# Patient Record
Sex: MALE | Race: WHITE
[De-identification: names, ages, dates, MRNs, and addresses within clinical notes are randomized per-mention and may not be internally consistent; named-entity substitution may affect disease eponyms.]

---

## 2020-01-19 ENCOUNTER — HOSPITAL ENCOUNTER (EMERGENCY)
Dept: HOSPITAL 56 - MW.ED | Age: 30
Discharge: HOME | End: 2020-01-19
Payer: COMMERCIAL

## 2020-01-19 DIAGNOSIS — K08.89: Primary | ICD-10-CM

## 2020-01-19 DIAGNOSIS — Z88.0: ICD-10-CM

## 2020-01-19 DIAGNOSIS — K02.9: Primary | ICD-10-CM

## 2020-01-19 PROCEDURE — 64400 NJX AA&/STRD TRIGEMINAL NRV: CPT

## 2020-01-19 PROCEDURE — 70486 CT MAXILLOFACIAL W/O DYE: CPT

## 2020-01-19 PROCEDURE — 99283 EMERGENCY DEPT VISIT LOW MDM: CPT

## 2020-01-19 PROCEDURE — 99282 EMERGENCY DEPT VISIT SF MDM: CPT

## 2020-01-19 PROCEDURE — 96372 THER/PROPH/DIAG INJ SC/IM: CPT

## 2020-01-19 NOTE — EDM.PDOC
ED HPI GENERAL MEDICAL PROBLEM





- General


Chief Complaint: General


Stated Complaint: SEVERE PAIN IN HEAD


Time Seen by Provider: 01/19/20 17:17





- History of Present Illness


INITIAL COMMENTS - FREE TEXT/NARRATIVE: 


Patient is a 29-year-old male with no significant past medical history 

presenting with chief complaint of dental pain.  Patient reports dental pain x1 

month from a chipped tooth.  Patient states that pain is becoming more severe 

causing him to have a headache.  Patient denies any fevers, chills, nausea, 

vomiting.  Patient denies any swelling of the area that is painful.  Patient 

states that he has missed 3 dentist appointments to follow-up for this dental 

pain.  Patient was here several hours ago but was unable to  any of his 

medications from the pharmacy due to issues with his Medicaid card.





Comprehensive review of systems was performed otherwise negative except as per 

HPI.





I have reviewed the triage vital signs


Const: Well nourished, well developed, appears stated age.  Nontoxic


Eyes: PERRL, no conjunctival injection


HENT: Fractured tooth #27.  No swelling of the submandibular area.  No trismus.

  No elevation of the tongue.  No drooling.  NCAT, Neck supple without 

meningismus 


MSK: No gross deformities appreciated


Skin: Warm, dry. No rashes


Neuro: Alert, CNs II-XII grossly intact. Sensation and motor function of 

extremities grossly intact.


Psych: Appropriate mood and affect





Assessment and plan:


Patient is a 29-year-old male presenting with chief complaint of dental pain.  

Patient has no major abscess noted on physical exam.  Patient underwent dental 

block, submandibular as well as inferior alveolar block for pain control.  

Patient tolerated procedure well with no immediate complications.  Patient 

instructed that he would not be given any more narcotic medications and he 

needs to schedule a dental appointment soon as possible to alleviate pain.  

Patient given strict return precautions to the emergency room.  All questions 

addressed and answered.  Patient agrees with plan





  ** dental


Pain Score (Numeric/FACES): 10





- Related Data


 Allergies











Allergy/AdvReac Type Severity Reaction Status Date / Time


 


Penicillins Allergy  Other Verified 01/19/20 16:08











Home Meds: 


 Home Meds





. [No Known Home Meds]  01/19/20 [History]











Past Medical History


HEENT History: Reports: None


Cardiovascular History: Reports: None


Respiratory History: Reports: None


Gastrointestinal History: Reports: None


Genitourinary History: Reports: None


Musculoskeletal History: Reports: None


Neurological History: Reports: None


Psychiatric History: Reports: None


Endocrine/Metabolic History: Reports: None


Hematologic History: Reports: None


Immunologic History: Reports: None


Oncologic (Cancer) History: Reports: None


Dermatologic History: Reports: None





- Past Surgical History


Head Surgeries/Procedures: Reports: None


HEENT Surgical History: Reports: Tonsillectomy


Cardiovascular Surgical History: Reports: None


Respiratory Surgical History: Reports: None


GI Surgical History: Reports: None


Male  Surgical History: Reports: None


Endocrine Surgical History: Reports: None


Neurological Surgical History: Reports: None


Musculoskeletal Surgical History: Reports: Other (See Below)


Other Musculoskeletal Surgeries/Procedures:: right hand surgery, scope to left 

knee


Oncologic Surgical History: Reports: None


Dermatological Surgical History: Reports: None





Social & Family History





- Family History


Family Medical History: Noncontributory





- Tobacco Use


Smoking Status *Q: Never Smoker


Second Hand Smoke Exposure: No





- Caffeine Use


Caffeine Use: Reports: None





- Recreational Drug Use


Recreational Drug Use: No





ED ROS GENERAL





- Review of Systems


Review Of Systems: See Below





ED EXAM, GENERAL





- Physical Exam


Exam: See Below





Course





- Vital Signs


Last Recorded V/S: 


 Last Vital Signs











Temp  36.3 C   01/19/20 16:06


 


Pulse  73   01/19/20 16:06


 


Resp  18   01/19/20 16:06


 


BP  145/97 H  01/19/20 16:06


 


Pulse Ox  97   01/19/20 16:06














- Orders/Labs/Meds


Meds: 


Medications














Discontinued Medications














Generic Name Dose Route Start Last Admin





  Trade Name Bobbyq  PRN Reason Stop Dose Admin


 


Bupivacaine HCl  10 ml  01/19/20 16:39  





  Sensorcaine-Mpf 0.5%  INJECT  01/19/20 16:40  





  ONETIME ONE   





     





     





     





     


 


Clindamycin HCl  150 mg  01/19/20 17:26  





  Cleocin  PO  01/19/20 17:27  





  ONETIME ONE   





     





     





     





     














Departure





- Departure


Time of Disposition: 17:30


Disposition: Home, Self-Care 01


Clinical Impression: 


 Pain, dental








- Discharge Information


Referrals: 


PCP,None [Primary Care Provider] - 


Forms:  ED Department Discharge


Additional Instructions: 


The following information is given to patients seen in the emergency department 

who are being discharged to home. This information is to outline your options 

for follow-up care. We provide all patients seen in our emergency department 

with a follow-up referral.





The need for follow-up, as well as the timing and circumstances, are variable 

depending upon the specifics of your emergency department visit.





If you don't have a primary care physician on staff, we will provide you with a 

referral. We always advise you to contact your personal physician following an 

emergency department visit to inform them of the circumstance of the visit and 

for follow-up with them and/or the need for any referrals to a consulting 

specialist.





The emergency department will also refer you to a specialist when appropriate. 

This referral assures that you have the opportunity for follow-up care with a 

specialist. All of these measure are taken in an effort to provide you with 

optimal care, which includes your follow-up.





Under all circumstances we always encourage you to contact your private 

physician who remains a resource for coordinating your care. When calling for 

follow-up care, please make the office aware that this follow-up is from your 

recent emergency room visit. If for any reason you are refused follow-up, 

please contact the [facility] Emergency 

Department at [phone] and asked to speak to the emergency department 

charge nurse.








Sepsis Event Note





- Evaluation


Sepsis Screening Result: No Definite Risk





- Focused Exam


Vital Signs: 


 Vital Signs











  Temp Pulse Resp BP Pulse Ox


 


 01/19/20 16:06  36.3 C  73  18  145/97 H  97











Date Exam was Performed: 01/19/20


Time Exam was Performed: 17:31

## 2020-01-19 NOTE — CT
INDICATION:



Right submandibular pain. Tooth/facial pain. History of osteomyelitis.



TECHNIQUE:



CT maxillofacial without contrast.



COMPARISON:



None



FINDINGS:



Facial bones: No fractures or bone lesions.  Specifically the nasal bones, 

temporomandibular joints, maxilla and mandible appear intact.  



Orbits and globes: Unremarkable. Globes are intact. No sign of intraorbital 

hemorrhage or emphysema. 



Sinuses: There is near complete opacification of the left maxillary sinus. 

Opacities are also present in the ethmoid sinuses. 



Soft tissues: Unremarkable.  



IMPRESSION:



1. No sign of osteomyelitis or acute periodontal disease. 



2. Dense opacification of the left maxillary sinus and to a lesser extent 

ethmoid sinuses consistent with infection/inflammation.



Please note that all CT scans at this facility use dose modulation, 

iterative reconstruction, and/or weight-based dosing when appropriate to 

reduce radiation dose to as low as reasonably achievable.



Dictated by Kevan Liang MD @ Jan 19 2020  7:03PM



Signed by Dr. Kevan Liang @ Jan 19 2020  7:16PM

## 2020-01-19 NOTE — EDM.PDOC
ED HPI GENERAL MEDICAL PROBLEM





- General


Chief Complaint: General


Stated Complaint: BROKEN TOOTH, MOUTH PAIN


Time Seen by Provider: 01/19/20 13:20


Source of Information: Reports: Patient


History Limitations: Reports: No Limitations





- History of Present Illness


INITIAL COMMENTS - FREE TEXT/NARRATIVE: 


HISTORY AND PHYSICAL:





History of present illness:


Patient is a 29-year-old male who presents to the emergency room with 

complaints of right lower dental pain and gum swelling.  He states over the 

past several months he has been dealing with a broken tooth but due to 

insurance reasons has not been able to follow-up with a dentist.  He states he 

is concerned that the tooth may be abscessed and he needs antibiotics.  He is 

also requesting pain medication at this time.  Patient denies any fever, chills

, headache, change in vision, syncope or near syncope. Denies any chest pain, 

back pain, shortness of breath or cough. Denies any GI or  symptoms.  Patient 

has been eating and drinking appropriately.





Review of systems: 


As per history of present illness and below otherwise all systems reviewed and 

negative.





Past medical history: 


As per history of present illness and as reviewed below otherwise 

noncontributory.





Surgical history: 


As per history of present illness and as reviewed below otherwise 

noncontributory.





Social history: 


See social history for further information





Family history: 


As per history of present illness and as reviewed below otherwise 

noncontributory.





Physical exam:


General: Well-developed and well-nourished 29-year-old male.  Alert and 

oriented.  Nontoxic-appearing and in no acute distress.


HEENT: Atraumatic, normocephalic, pupils equal and reactive bilaterally, 

negative for conjunctival pallor or scleral icterus, mucous membranes moist, 

TMs normal bilaterally, multiple dental caries and decay noted, mild gumline 

swelling to the right lower gumline, throat clear, neck supple, nontender, 

trachea midline. No drooling or trismus noted. No meningeal signs. No hot 

potato voice noted. 


Lungs: Clear to auscultation, breath sounds equal bilaterally, chest nontender.


Heart: S1S2, regular rate and rhythm without overt murmur


Abdomen: Soft, nondistended, nontender. Negative for masses or 

hepatosplenomegaly. Negative for costovertebral tenderness.


Pelvis: Stable nontender.


Skin: Intact, warm, dry. No lesions or rashes noted.


Extremities: Atraumatic, moves all extremities per self without difficulty or 

deficits, negative for cords or calf pain. Neurovascular unremarkable.


Neuro: Awake, alert, oriented. Cranial nerves II through XII unremarkable. 

Cerebellum unremarkable. Motor and sensory unremarkable throughout. Exam 

nonfocal.





Notes:


We discussed the need for follow-up with his dentist.  Medication and 

supportive care measures were reviewed and discussed. Voices understanding and 

is agreeable to plan of care. Denies any further questions or concerns at this 

time.





Diagnostics:


None





Therapeutics:


Dental balls





Prescription:


Clindamycin


Tylenol #3 (#20)





Impression: 


Dentalgia


Dental decay





Plan:


1. Please take the antibiotic as prescribed.


2. Tylenol and/or ibuprofen as needed for pain management. "Tooth Balls" have 

been given to you; apply along the gumline every 2-3 hours as needed. Do not 

swallow these; external use only. 


3. Follow-up with a dentist for definitive care. Return to the ED as needed and 

as discussed.





Definitive disposition and diagnosis as appropriate pending reevaluation and 

review of above.





  ** dental


Pain Score (Numeric/FACES): 4





- Related Data


 Allergies











Allergy/AdvReac Type Severity Reaction Status Date / Time


 


Penicillins Allergy  Other Verified 01/19/20 16:08











Home Meds: 


 Home Meds





. [No Known Home Meds]  01/19/20 [History]











ED ROS GENERAL





- Review of Systems


Review Of Systems: Comprehensive ROS is negative, except as noted in HPI.





ED EXAM, GENERAL





- Physical Exam


Exam: See Below (See dictation)





Course





- Vital Signs


Last Recorded V/S: 


 Last Vital Signs











Temp  97.0 F   01/19/20 13:19


 


Pulse  87   01/19/20 13:19


 


Resp  18   01/19/20 13:19


 


BP  111/90   01/19/20 13:19


 


Pulse Ox  99   01/19/20 13:19














- Orders/Labs/Meds


Meds: 


Medications














Discontinued Medications














Generic Name Dose Route Start Last Admin





  Trade Name Maddy  PRN Reason Stop Dose Admin


 


Benzocaine  2 each  01/19/20 13:22  01/19/20 13:38





  Hurricaine One 20%  MUCMEM  01/19/20 13:23  2 each





  ONETIME ONE   Administration





     





     





     





     


 


Lidocaine HCl  15 ml  01/19/20 13:22  01/19/20 13:38





  Xylocaine 2% Viscous  PO  01/19/20 13:23  15 ml





  ONETIME ONE   Administration





     





     





     





     














Departure





- Departure


Time of Disposition: 13:27


Disposition: Home, Self-Care 01


Clinical Impression: 


 Dentalgia, Dental decay








- Discharge Information


Instructions:  Tooth Injuries


Forms:  ED Department Discharge


Additional Instructions: 


The following information is given to patients seen in the emergency department 

who are being discharged to home. This information is to outline your options 

for follow-up care. We provide all patients seen in our emergency department 

with a follow-up referral.





The need for follow-up, as well as the timing and circumstances, are variable 

depending upon the specifics of your emergency department visit.





If you don't have a primary care physician on staff, we will provide you with a 

referral. We always advise you to contact your personal physician following an 

emergency department visit to inform them of the circumstance of the visit and 

for follow-up with them and/or the need for any referrals to a consulting 

specialist.





The emergency department will also refer you to a specialist when appropriate. 

This referral assures that you have the opportunity for follow-up care with a 

specialist. All of these measure are taken in an effort to provide you with 

optimal care, which includes your follow-up.





Under all circumstances we always encourage you to contact your private 

physician who remains a resource for coordinating your care. When calling for 

follow-up care, please make the office aware that this follow-up is from your 

recent emergency room visit. If for any reason you are refused follow-up, 

please contact the [facility] Emergency 

Department at [phone] and asked to speak to the emergency department 

charge nurse.





[facility]


Primary Care


[address] [place] [id number]


Phone: [phone]


Fax: [phone]





[address] [address]


Phone: [phone]


Fax: [phone]





1. Please take the antibiotic as prescribed.


2. Tylenol and/or ibuprofen as needed for pain management. "Tooth Balls" have 

been given to you; apply along the gumline every 2-3 hours as needed. Do not 

swallow these; external use only. 


3. Follow-up with a dentist for definitive care. Return to the ED as needed and 

as discussed.





Sepsis Event Note





- Focused Exam


Vital Signs: 


 Vital Signs











  Temp Pulse Resp BP Pulse Ox


 


 01/19/20 13:19  97.0 F  87  18  111/90  99











Date Exam was Performed: 01/19/20


Time Exam was Performed: 16:31

## 2020-01-20 ENCOUNTER — HOSPITAL ENCOUNTER (EMERGENCY)
Dept: HOSPITAL 56 - MW.ED | Age: 30
Discharge: HOME | End: 2020-01-20
Payer: COMMERCIAL

## 2020-01-20 DIAGNOSIS — Z98.890: ICD-10-CM

## 2020-01-20 DIAGNOSIS — F17.210: ICD-10-CM

## 2020-01-20 DIAGNOSIS — Z88.0: ICD-10-CM

## 2020-01-20 DIAGNOSIS — K04.7: Primary | ICD-10-CM

## 2020-01-20 DIAGNOSIS — K03.81: ICD-10-CM

## 2020-01-20 DIAGNOSIS — K03.81: Primary | ICD-10-CM

## 2020-01-20 PROCEDURE — 99283 EMERGENCY DEPT VISIT LOW MDM: CPT

## 2020-01-20 PROCEDURE — 99282 EMERGENCY DEPT VISIT SF MDM: CPT

## 2020-01-20 PROCEDURE — 64400 NJX AA&/STRD TRIGEMINAL NRV: CPT

## 2020-01-20 PROCEDURE — 96372 THER/PROPH/DIAG INJ SC/IM: CPT

## 2020-01-20 NOTE — EDM.PDOC
ED HPI GENERAL MEDICAL PROBLEM





- General


Chief Complaint: ENT Problem


Stated Complaint: TOOTH PAIN


Time Seen by Provider: 01/20/20 07:44


Source of Information: Reports: Patient


History Limitations: Reports: No Limitations





- History of Present Illness


INITIAL COMMENTS - FREE TEXT/NARRATIVE: 


Patient is a 29-year-old male no past medical history presenting with dental 

pain.  Patient seen twice yesterday for the same complaint.  Patient had CT 

scan of the face done yesterday which did not demonstrate any osteomyelitis or 

peridental disease.  Patient states that the pain is severe in the lower part 

of the mandible.  Patient states that the Toradol shot that he received 

yesterday helped a lot.  Patient denies any fevers, or change in symptoms.





Review of systems negative except as listed above.





I have reviewed the triage vital signs


Const: Well nourished, well developed, appears stated age


Eyes: PERRL, no conjunctival injection


HENT: Fractured tooth of the lower tooth #27.  No swelling of the gums or 

abscess visible.. NCAT, Neck supple without meningismus 


MSK: No gross deformities appreciated


Skin: Warm, dry. No rashes


Neuro: Alert, CNs II-XII grossly intact. Sensation and motor function of 

extremities grossly intact.


Psych: Appropriate mood and affect





Assessment and plan:


Patient 29-year-old male with dental pain.  Patient has no osteomyelitis no 

developing abscess.  Patient here for pain control.  Patient will be given shot 

of Toradol and referred to dentist as it is a Monday and he should be able to 

get clinic for tooth removal.  All questions addressed and answered.  Patient 

agrees with plan.








  ** right dental


Pain Score (Numeric/FACES): 10





- Related Data


 Allergies











Allergy/AdvReac Type Severity Reaction Status Date / Time


 


Penicillins Allergy  Other Verified 01/20/20 06:56











Home Meds: 


 Home Meds





. [No Known Home Meds]  01/19/20 [History]











Past Medical History


HEENT History: Reports: None


Cardiovascular History: Reports: None


Respiratory History: Reports: None


Gastrointestinal History: Reports: None


Genitourinary History: Reports: None


Musculoskeletal History: Reports: None


Neurological History: Reports: None


Psychiatric History: Reports: None


Endocrine/Metabolic History: Reports: None


Hematologic History: Reports: None


Immunologic History: Reports: None


Oncologic (Cancer) History: Reports: None


Dermatologic History: Reports: None





- Infectious Disease History


Infectious Disease History: Reports: None





- Past Surgical History


Head Surgeries/Procedures: Reports: None


HEENT Surgical History: Reports: Tonsillectomy


Cardiovascular Surgical History: Reports: None


Respiratory Surgical History: Reports: None


GI Surgical History: Reports: None


Male  Surgical History: Reports: None


Endocrine Surgical History: Reports: None


Neurological Surgical History: Reports: None


Musculoskeletal Surgical History: Reports: Other (See Below)


Other Musculoskeletal Surgeries/Procedures:: right hand surgery, scope to left 

knee


Oncologic Surgical History: Reports: None


Dermatological Surgical History: Reports: None





Social & Family History





- Family History


Family Medical History: Noncontributory





- Tobacco Use


Smoking Status *Q: Current Every Day Smoker


Years of Tobacco use: 3


Packs/Tins Daily: 0.3





- Caffeine Use


Caffeine Use: Reports: None





- Recreational Drug Use


Recreational Drug Use: No





ED ROS ENT





- Review of Systems


Review Of Systems: See Below





ED EXAM, ENT





- Physical Exam


Exam: See Below





Course





- Vital Signs


Last Recorded V/S: 





 Last Vital Signs











Temp  36.7 C   01/20/20 06:41


 


Pulse  90   01/20/20 06:41


 


Resp  18   01/20/20 06:41


 


BP  137/97 H  01/20/20 06:41


 


Pulse Ox  96   01/20/20 06:41














- Orders/Labs/Meds


Orders: 





 Active Orders 24 hr











 Category Date Time Status


 


 clindamycin HCL [Cleocin] Med  01/20/20 07:43 Once





 150 mg PO ONETIME ONE   











Meds: 





Medications














Discontinued Medications














Generic Name Dose Route Start Last Admin





  Trade Name Freq  PRN Reason Stop Dose Admin


 


Bupivacaine HCl  10 ml  01/20/20 07:10  01/20/20 07:31





  Sensorcaine-Mpf 0.5%  INJECT  01/20/20 07:11  10 ml





  ONETIME ONE   Administration





     





     





     





     


 


Clindamycin Palmitate HCl  150 mg  01/20/20 07:32  





  Cleocin  PO  01/20/20 07:33  





  ONETIME ONE   





     





     





     





     


 


Ketorolac Tromethamine  30 mg  01/20/20 07:10  01/20/20 07:31





  Toradol  IM  01/20/20 07:11  30 mg





  ONETIME ONE   Administration





     





     





     





     














Departure





- Departure


Time of Disposition: 07:46


Disposition: Home, Self-Care 01


Clinical Impression: 


 Dentalgia








- Discharge Information


Referrals: 


PCP,None [Primary Care Provider] - 





Sepsis Event Note





- Evaluation


Sepsis Screening Result: No Definite Risk





- Focused Exam


Vital Signs: 





 Vital Signs











  Temp Pulse Resp BP Pulse Ox


 


 01/20/20 06:41  36.7 C  90  18  137/97 H  96











Date Exam was Performed: 01/20/20


Time Exam was Performed: 07:43





- My Orders


Last 24 Hours: 





My Active Orders





01/20/20 07:43


clindamycin HCL [Cleocin]   150 mg PO ONETIME ONE 














- Assessment/Plan


Last 24 Hours: 





My Active Orders





01/20/20 07:43


clindamycin HCL [Cleocin]   150 mg PO ONETIME ONE

## 2020-01-20 NOTE — EDM.PDOC
ED HPI GENERAL MEDICAL PROBLEM





- General


Chief Complaint: ENT Problem


Stated Complaint: TOOTH PAIN


Time Seen by Provider: 01/20/20 20:04


Source of Information: Reports: Patient


History Limitations: Reports: No Limitations





- History of Present Illness


INITIAL COMMENTS - FREE TEXT/NARRATIVE: 





HISTORY AND PHYSICAL:





History of present illness:


Patient is a 29-year-old male presents to the ED with complaint of dental pain. 

This is patient's fourth visit in the past 2 days. Patient states he was unable 

to  antibiotic and Tylenol #3 because he could not afford it. He states 

he is going to be able to  the medications tomorrow. Patient has 

received 2 Toradol IM injections in the past 2 days and states that this helps 

with his pain. He denies fevers, chills, nausea, vomiting. He has an 

appointment with a dentist on 1/22/2020. 





Review of systems: 


As per history of present illness and below otherwise all systems reviewed and 

negative.





Past medical history: 


As per history of present illness and as reviewed below otherwise 

noncontributory.





Surgical history: 


As per history of present illness and as reviewed below otherwise 

noncontributory.





Social history: 


No reported history of drug or alcohol abuse.





Family history: 


As per history of present illness and as reviewed below otherwise 

noncontributory.





Physical exam:


General: Patient sitting comfortably in no acute distress and nontoxic appearing


HEENT: Swelling to the right lower jaw with a broken tooth #27 with evidence of 

abscess. Atraumatic, normocephalic, pupils reactive, negative for conjunctival 

pallor or scleral icterus, mucous membranes moist, throat clear, neck supple, 

nontender, trachea midline. No meningeal signs. 


Lungs: Clear to auscultation, breath sounds equal bilaterally, chest nontender.


Heart: S1S2, regular, negative for clicks, rubs, or overt murmur.


Abdomen: Soft, nondistended, nontender. Negative for masses or 

hepatosplenomegaly. Negative for costovertebral tenderness. No rigidity, rebound

, guarding.


Pelvis: Stable nontender.


Genitourinary: Deferred.


Rectal: Deferred.


Extremities: Atraumatic, negative for cords or calf pain. Neurovascular 

unremarkable.


Neuro: Awake, alert, oriented. Cranial nerves II through XII unremarkable. 

Cerebellum unremarkable. Motor and sensory unremarkable throughout. Exam 

nonfocal.





Notes: 





Diagnostics:


none 





Therapeutics:


Clindamycin PO


Tylenol #3 PO 





Prescriptions:


none 





Impression: 


Dentalgia





Plan:


Take your medications as prescribed


Follow up with dentist


Return to ED as needed as discussed 





Definitive disposition and diagnosis as appropriate pending reevaluation and 

review of above.





  ** Right Oral/Mouth


Pain Score (Numeric/FACES): 10





- Related Data


 Allergies











Allergy/AdvReac Type Severity Reaction Status Date / Time


 


Penicillins Allergy  Other Verified 01/20/20 19:35











Home Meds: 


 Home Meds





. [No Known Home Meds]  01/19/20 [History]











Past Medical History


HEENT History: Reports: None


Cardiovascular History: Reports: None


Respiratory History: Reports: None


Gastrointestinal History: Reports: None


Genitourinary History: Reports: None


Musculoskeletal History: Reports: None


Neurological History: Reports: None


Psychiatric History: Reports: None


Endocrine/Metabolic History: Reports: None


Hematologic History: Reports: None


Immunologic History: Reports: None


Oncologic (Cancer) History: Reports: None


Dermatologic History: Reports: None





- Infectious Disease History


Infectious Disease History: Reports: None





- Past Surgical History


Head Surgeries/Procedures: Reports: None


HEENT Surgical History: Reports: Tonsillectomy


Cardiovascular Surgical History: Reports: None


Respiratory Surgical History: Reports: None


GI Surgical History: Reports: None


Male  Surgical History: Reports: None


Endocrine Surgical History: Reports: None


Neurological Surgical History: Reports: None


Musculoskeletal Surgical History: Reports: Other (See Below)


Other Musculoskeletal Surgeries/Procedures:: right hand surgery, scope to left 

knee


Oncologic Surgical History: Reports: None


Dermatological Surgical History: Reports: None





Social & Family History





- Family History


Family Medical History: Noncontributory





- Tobacco Use


Smoking Status *Q: Current Every Day Smoker


Years of Tobacco use: 3


Packs/Tins Daily: 0.5





- Caffeine Use


Caffeine Use: Reports: None





- Recreational Drug Use


Recreational Drug Use: No





ED ROS ENT





- Review of Systems


Review Of Systems: Comprehensive ROS is negative, except as noted in HPI.





ED EXAM, ENT





- Physical Exam


Exam: See Below (see dictation)





Course





- Vital Signs


Last Recorded V/S: 


 Last Vital Signs











Temp  97.7 F   01/20/20 19:35


 


Pulse  73   01/20/20 19:35


 


Resp  18   01/20/20 19:35


 


BP  147/84 H  01/20/20 19:35


 


Pulse Ox  98   01/20/20 19:35














- Orders/Labs/Meds


Meds: 


Medications














Discontinued Medications














Generic Name Dose Route Start Last Admin





  Trade Name Maddy  PRN Reason Stop Dose Admin


 


Acetaminophen/Codeine Phosphate  1 tab  01/20/20 20:05  





  Tylenol With Codeine No.3 300mg/30mg  PO  01/20/20 20:06  





  ONETIME ONE   





     





     





     





     


 


Clindamycin HCl  450 mg  01/20/20 20:04  





  Cleocin  PO  01/20/20 20:05  





  NOW STA   





     





     





     





     














Departure





- Departure


Time of Disposition: 20:09


Disposition: Home, Self-Care 01


Condition: Good


Clinical Impression: 


 Dentalgia








- Discharge Information


Referrals: 


PCP,None [Primary Care Provider] - 


Forms:  ED Department Discharge


Additional Instructions: 


The following information is given to patients seen in the emergency department 

who are being discharged to home. This information is to outline your options 

for follow-up care. We provide all patients seen in our emergency department 

with a follow-up referral.





The need for follow-up, as well as the timing and circumstances, are variable 

depending upon the specifics of your emergency department visit.





If you don't have a primary care physician on staff, we will provide you with a 

referral. We always advise you to contact your personal physician following an 

emergency department visit to inform them of the circumstance of the visit and 

for follow-up with them and/or the need for any referrals to a consulting 

specialist.





The emergency department will also refer you to a specialist when appropriate. 

This referral assures that you have the opportunity for follow-up care with a 

specialist. All of these measure are taken in an effort to provide you with 

optimal care, which includes your follow-up.





Under all circumstances we always encourage you to contact your private 

physician who remains a resource for coordinating your care. When calling for 

follow-up care, please make the office aware that this follow-up is from your 

recent emergency room visit. If for any reason you are refused follow-up, 

please contact the [facility] Emergency 

Department at [phone] and asked to speak to the emergency department 

charge nurse.





[facility]


Primary Care


[address] [place] [id number]


Phone: [phone]


Fax: [phone]





[address] [address]


Phone: [phone]


Fax: [phone]











Take your medications as prescribed


Follow up with dentist


Return to ED as needed as discussed 








Sepsis Event Note





- Evaluation


Sepsis Screening Result: No Definite Risk





- Focused Exam


Vital Signs: 


 Vital Signs











  Temp Pulse Resp BP Pulse Ox


 


 01/20/20 19:35  97.7 F  73  18  147/84 H  98











Date Exam was Performed: 01/20/20


Time Exam was Performed: 20:08

## 2020-01-21 ENCOUNTER — HOSPITAL ENCOUNTER (EMERGENCY)
Dept: HOSPITAL 56 - MW.ED | Age: 30
Discharge: NURSING, REHAB OR LONG-TERM CARE | End: 2020-01-21
Payer: COMMERCIAL

## 2020-01-21 DIAGNOSIS — K04.7: Primary | ICD-10-CM

## 2020-01-21 DIAGNOSIS — Z87.891: ICD-10-CM

## 2020-01-21 DIAGNOSIS — Z88.0: ICD-10-CM

## 2020-01-21 LAB
BUN SERPL-MCNC: 14 MG/DL (ref 7–18)
CHLORIDE SERPL-SCNC: 103 MMOL/L (ref 98–107)
CO2 SERPL-SCNC: 25.6 MMOL/L (ref 21–32)
GLUCOSE SERPL-MCNC: 100 MG/DL (ref 74–106)
POTASSIUM SERPL-SCNC: 4.3 MMOL/L (ref 3.5–5.1)
SODIUM SERPL-SCNC: 138 MMOL/L (ref 136–148)

## 2020-01-21 PROCEDURE — 96365 THER/PROPH/DIAG IV INF INIT: CPT

## 2020-01-21 PROCEDURE — 80053 COMPREHEN METABOLIC PANEL: CPT

## 2020-01-21 PROCEDURE — 85025 COMPLETE CBC W/AUTO DIFF WBC: CPT

## 2020-01-21 PROCEDURE — 99284 EMERGENCY DEPT VISIT MOD MDM: CPT

## 2020-01-21 PROCEDURE — 83605 ASSAY OF LACTIC ACID: CPT

## 2020-01-21 PROCEDURE — 36415 COLL VENOUS BLD VENIPUNCTURE: CPT

## 2020-01-21 PROCEDURE — 87040 BLOOD CULTURE FOR BACTERIA: CPT

## 2020-01-21 PROCEDURE — 96375 TX/PRO/DX INJ NEW DRUG ADDON: CPT

## 2020-01-21 PROCEDURE — 70491 CT SOFT TISSUE NECK W/DYE: CPT

## 2020-01-21 NOTE — CT
INDICATION:



Right jaw and neck swelling



TECHNIQUE:



CT soft tissue of the neck was acquired with IV contrast. 75 mL of Isovue 

370 administered. 



COMPARISON:



A facial CT dated 01/19/2020



FINDINGS:



There is right perimandibular soft tissue swelling and edema, increased. 

There is apparent phlegmon along the outer aspect of the right mandible and 

an ovoid low-attenuation area along the inner aspect of the right mandible 

on images 49/50 of series 201, without significant peripheral enhancement 

at this time. There is apparent edema/phlegmon at the floor of mouth/base 

of tongue region. 



The adenoids and lingual tonsils are prominent. The nasopharynx, 

oropharynx, hypopharynx and larynx are patent. No discrete tonsillar or 

peritonsillar collection is seen. 



The parotid and submandibular glands are within normal limits. No discrete 

thyroid abnormality is seen. 



There are mildly enlarged right submandibular and right level 2 cervical 

lymph nodes measuring up to 1.2 cm in short axis. 



There is moderate to severe left maxillary sinus mucosal thickening with 

debris and apparent fluid, and mild ethmoid sinus mucosal thickening. No 

suspicious or acute osseous abnormality is seen. Small thymic tissue seen 

in the anterior mediastinum. 



IMPRESSION:



Right perimandibular soft tissue swelling and edema with areas of apparent 

phlegmon along the right mandible. Apparent edema/phlegmon at the floor of 

mouth/base of tongue region. A discrete, peripherally enhancing fluid 

collection is not seen at this time, although an early evolving collection 

along the medial aspect of the right mandible may be present. Correlate 

clinically 



Mildly prominent right submandibular and level 2 cervical lymph nodes. 



Paranasal sinus disease, more pronounced in the left maxillary sinus, with 

an apparent air-fluid level.



Dictated by Saulo De Dios MD @ 1/21/2020 6:02:19 AM



Please note that all CT scans at this facility use dose modulation, 

iterative reconstruction, and/or weight-based dosing when appropriate to 

reduce radiation dose to as low as reasonably achievable.



Dictated by: Saulo De Dios MD @ 01/21/2020 06:02:24



(Electronically Signed)

## 2020-01-21 NOTE — EDM.PDOC
ED HPI GENERAL MEDICAL PROBLEM





- General


Chief Complaint: ENT Problem


Stated Complaint: RT SIDE OF FACE IS SWOLLEN AND NUMB


Time Seen by Provider: 01/21/20 03:17


Source of Information: Reports: Patient


History Limitations: Reports: No Limitations





- History of Present Illness


INITIAL COMMENTS - FREE TEXT/NARRATIVE: 


HISTORY OF PRESENT ILLNESS:  Patient is a 29 year old male who presents with 

dental pain and facial swelling. This is his 5th visit in 2 days for the same. 

He has been given rx for Clindamycin and pain medication and has follow up with 

dentist scheduled 1/22/20. He has not filled his pain medication, antibiotic rx

, or picked up any Motrin or Tylenol. He states he cannot afford the medication 

and has not attempted to obtain it. He has received dose of Clindamycin 450 mg 

PO on last visit. Has received Toradol IM on 2 prior visits and received T#3 on 

last visit. Reports increased right lower facial swelling and dental pain. 

Denies fever. Able to swallow, but feels as though swelling is starting to 

affect his airway. No cp or dyspnea. No rash. 








REVIEW OF SYSTEMS:  Other than the symptoms associated with the present events, 

the following is reported with regard to recent health:  


General:  (-) fever.  


HENT:  (-) congestion. 


 Respiratory:  (-) cough.  


Cardiovascular:  (-) chest pain.  


GI:  (-) abdominal pain.  


:  (-) urinary complaints. 


 Musculoskeletal:  (-) other aches or pains. 


 Endocrine:  (-) generalized weakness.  


Neurological:  (-) localized weakness.  


Skin: (-) rash








 PAST MEDICAL HISTORY: reviewed as per nursing notes


 SOCIAL HISTORY:  reviewed as per nursing notes,


 MEDICATIONS:  Per nurse's note


 ALLERGIES:  Per nurse's note, reviewed by me 














PHYSICAL EXAMINATION:


 GENERALIZED APPEARANCE: well developed, well nourished in mild to moderate 

distress


 VITAL SIGNS:  Per nurse's note, reviewed by me 


 SKIN:  Warm, dry; (-) cyanosis; (-) rash.


 HEAD:  (-) scalp swelling, (-) tenderness.


 EYES:  (-) conjunctival pallor, (-) scleral icterus.


 ENMT:   (-) stridor; mucous membranes moist. poor dentition. Broken tooth #27, 

significant fluctuant area right mandibular buccal mucosa. submandibular 

swelling and tenderness on right. uvula midline. no phonation changes. no 

active discharge. no trismus. no brawny appearance to neck. 


 NECK:  (-) tenderness, (-) stiffness,


 CHEST AND RESPIRATORY:  (-) rales, (-) rhonchi, (-) wheezes; breath sounds 

equal bilaterally.


 HEART AND CARDIOVASCULAR:  (-) irregularity; (-) murmur, (-) gallop.


 ABDOMEN AND GI:  nondistended


 EXTREMITIES:  (-) deformity, (-) edema.  


 NEURO AND PSYCH: Alert.  Cranial nerves grossly intact; strength symmetric. 

gait steady


   


 DIAGNOSTICS:


WBC: 14


Lactic acid wnl


Blood cultures x 2 drawn and pending.


CT soft tissue neck: right perimandibular soft tissue swelling and edema with 

areas of apparent phlegmon along the right mandible.  Apparent edema/phlegmon 

at the floor of the mouth/base of the tongue region.  A discrete peripheral 

enhancing fluid collection is not seen at this time although an early evolving 

collection along the medial aspect of the right mandible may be present.  

Mildly prominent right mandibular and level 2 cervical lymph nodes. as read by 

radiologist











EMERGENCY DEPARTMENT COURSE AND TREATMENT:  Patient's condition remained stable 

during Emergency Department evaluation.  Blood cultures x 2 drawn. Given 

Clindamycin 600 mg IV after cultures. CT resulted as above. Concern for early 

Elías's angina. His airway presently is patent, but will require admission. No 

ENT available at this facility. Case d/w Kadi Ernst, Dr. Lawton, who kindly 

agrees to accept transfer. 





IMPRESSION:


1. Right facial swelling/dental abscess failed outpatient management


2. Suspicion for early Elías's angina








[facility]








  ** left lower jaw


Pain Score (Numeric/FACES): 8





- Related Data


 Allergies











Allergy/AdvReac Type Severity Reaction Status Date / Time


 


Penicillins Allergy  Other Verified 01/20/20 19:35











Home Meds: 


 Home Meds





. [No Known Home Meds]  01/19/20 [History]











Past Medical History


HEENT History: Reports: None


Cardiovascular History: Reports: None


Respiratory History: Reports: None


Gastrointestinal History: Reports: None


Genitourinary History: Reports: None


Musculoskeletal History: Reports: None


Neurological History: Reports: None


Psychiatric History: Reports: None


Endocrine/Metabolic History: Reports: None


Hematologic History: Reports: None


Immunologic History: Reports: None


Oncologic (Cancer) History: Reports: None


Dermatologic History: Reports: None





- Infectious Disease History


Infectious Disease History: Reports: None





- Past Surgical History


Head Surgeries/Procedures: Reports: None


HEENT Surgical History: Reports: Tonsillectomy


Cardiovascular Surgical History: Reports: None


Respiratory Surgical History: Reports: None


GI Surgical History: Reports: None


Male  Surgical History: Reports: None


Endocrine Surgical History: Reports: None


Neurological Surgical History: Reports: None


Musculoskeletal Surgical History: Reports: Other (See Below)


Other Musculoskeletal Surgeries/Procedures:: right hand surgery, scope to left 

knee


Oncologic Surgical History: Reports: None


Dermatological Surgical History: Reports: None





Social & Family History





- Family History


Family Medical History: Noncontributory





- Tobacco Use


Smoking Status *Q: Former Smoker


Used Tobacco, but Quit: Yes


Month/Year Tobacco Last Used: 01/20





- Caffeine Use


Caffeine Use: Reports: None





- Recreational Drug Use


Recreational Drug Use: No





ED ROS ENT





- Review of Systems


Review Of Systems: See Below (see dictation)





ED EXAM, ENT





- Physical Exam


Exam: See Below (see dictation)





Course





- Vital Signs


Last Recorded V/S: 


 Last Vital Signs











Temp  97.2 F   01/21/20 03:06


 


Pulse  85   01/21/20 03:06


 


Resp  18   01/21/20 03:06


 


BP  141/93 H  01/21/20 03:06


 


Pulse Ox  97   01/21/20 03:06














- Orders/Labs/Meds


Orders: 


 Active Orders 24 hr











 Category Date Time Status


 


 CULTURE BLOOD [BC] Stat Lab  01/21/20 04:00 Received


 


 CULTURE BLOOD [BC] Stat Lab  01/21/20 04:19 Received


 


 Sodium Chloride 0.9% [Saline Flush] Med  01/21/20 03:58 Active





 10 ml FLUSH ASDIRECTED PRN   


 


 Sodium Chloride 0.9% [Saline Flush] Med  01/21/20 03:58 Active





 2.5 ml FLUSH ASDIRECTED PRN   


 


 Blood Culture x2 Reflex Set [OM.PC] Stat Oth  01/21/20 03:54 Ordered


 


 Saline Lock Insert [OM.PC] Stat Oth  01/21/20 03:58 Ordered








 Medication Orders





Sodium Chloride (Saline Flush)  10 ml FLUSH ASDIRECTED PRN


   PRN Reason: Keep Vein Open


Sodium Chloride (Saline Flush)  2.5 ml FLUSH ASDIRECTED PRN


   PRN Reason: Keep Vein Open








Labs: 


 Laboratory Tests











  01/21/20 01/21/20 01/21/20 Range/Units





  04:00 04:00 04:00 


 


WBC  14.24 H    (4.0-11.0)  K/uL


 


RBC  5.19    (4.50-5.90)  M/uL


 


Hgb  15.7    (13.0-17.0)  g/dL


 


Hct  46.5    (38.0-50.0)  %


 


MCV  89.6    (80.0-98.0)  fL


 


MCH  30.3    (27.0-32.0)  pg


 


MCHC  33.8    (31.0-37.0)  g/dL


 


RDW Std Deviation  45.3    (28.0-62.0)  fl


 


RDW Coeff of Millie  14    (11.0-15.0)  %


 


Plt Count  307    (150-400)  K/uL


 


MPV  9.80    (7.40-12.00)  fL


 


Neut % (Auto)  70.0    (48.0-80.0)  %


 


Lymph % (Auto)  17.3    (16.0-40.0)  %


 


Mono % (Auto)  10.1    (0.0-15.0)  %


 


Eos % (Auto)  2.4    (0.0-7.0)  %


 


Baso % (Auto)  0.2    (0.0-1.5)  %


 


Neut # (Auto)  10.0 H    (1.4-5.7)  K/uL


 


Lymph # (Auto)  2.5 H    (0.6-2.4)  K/uL


 


Mono # (Auto)  1.4 H    (0.0-0.8)  K/uL


 


Eos # (Auto)  0.3    (0.0-0.7)  K/uL


 


Baso # (Auto)  0.0    (0.0-0.1)  K/uL


 


Nucleated RBC %  0.0    /100WBC


 


Nucleated RBCs #  0    K/uL


 


Lactate   0.7   (0.20-2.00)  mmol/L


 


Sodium    138  (136-148)  mmol/L


 


Potassium    4.3  (3.5-5.1)  mmol/L


 


Chloride    103  ()  mmol/L


 


Carbon Dioxide    25.6  (21.0-32.0)  mmol/L


 


BUN    14  (7.0-18.0)  mg/dL


 


Creatinine    0.9  (0.8-1.3)  mg/dL


 


Est Cr Clr Drug Dosing    111.19  mL/min


 


Estimated GFR (MDRD)    > 60.0  ml/min


 


Glucose    100  ()  mg/dL


 


Calcium    9.0  (8.5-10.1)  mg/dL


 


Total Bilirubin    0.5  (0.2-1.0)  mg/dL


 


AST    25  (15-37)  IU/L


 


ALT    27  (14-63)  IU/L


 


Alkaline Phosphatase    69  ()  U/L


 


Total Protein    7.7  (6.4-8.2)  g/dL


 


Albumin    3.8  (3.4-5.0)  g/dL


 


Globulin    3.9  (2.6-4.0)  g/dL


 


Albumin/Globulin Ratio    1.0  (0.9-1.6)  











Meds: 


Medications











Generic Name Dose Route Start Last Admin





  Trade Name Freq  PRN Reason Stop Dose Admin


 


Sodium Chloride  10 ml  01/21/20 03:58  





  Saline Flush  FLUSH   





  ASDIRECTED PRN   





  Keep Vein Open   





     





     





     


 


Sodium Chloride  2.5 ml  01/21/20 03:58  





  Saline Flush  FLUSH   





  ASDIRECTED PRN   





  Keep Vein Open   





     





     





     














Discontinued Medications














Generic Name Dose Route Start Last Admin





  Trade Name Freq  PRN Reason Stop Dose Admin


 


Clindamycin Phosphate 600 mg/  50 mls @ 100 mls/hr  01/21/20 04:03  01/21/20 04:

10





  Premix  IV  01/21/20 04:32  100 mls/hr





  ONETIME ONE   Administration





     





     





     





     


 


Ibuprofen  600 mg  01/21/20 04:38  01/21/20 04:59





  Motrin  PO  01/21/20 04:39  600 mg





  ONETIME ONE   Administration





     





     





     





     


 


Iopamidol  100 ml  01/21/20 04:53  01/21/20 04:54





  Isovue Multipack-370 (76%)  IVPUSH  01/21/20 04:54  100 ml





  ONETIME ONE   Administration





     





     





     





     














Departure





- Departure


Time of Disposition: 06:22


Disposition: DC/[facility]fer to Acute [facility]


Condition: Good


Clinical Impression: 


 Dental abscess, Facial swelling








- Discharge Information


Referrals: 


PCP,None [Primary Care Provider] - 


Forms:  Interfacility Transfer EMTALA





Sepsis Event Note





- Evaluation


Sepsis Screening Result: No Definite Risk





- Focused Exam


Vital Signs: 


 Vital Signs











  Temp Pulse Resp BP Pulse Ox


 


 01/21/20 03:06  97.2 F  85  18  141/93 H  97











Date Exam was Performed: 01/21/20


Time Exam was Performed: 06:12





- My Orders


Last 24 Hours: 


My Active Orders





01/21/20 03:54


Blood Culture x2 Reflex Set [OM.PC] Stat 





01/21/20 03:58


Sodium Chloride 0.9% [Saline Flush]   10 ml FLUSH ASDIRECTED PRN 


Sodium Chloride 0.9% [Saline Flush]   2.5 ml FLUSH ASDIRECTED PRN 


Saline Lock Insert [OM.PC] Stat 





01/21/20 04:00


CULTURE BLOOD [BC] Stat 





01/21/20 04:19


CULTURE BLOOD [BC] Stat 














- Assessment/Plan


Last 24 Hours: 


My Active Orders





01/21/20 03:54


Blood Culture x2 Reflex Set [OM.PC] Stat 





01/21/20 03:58


Sodium Chloride 0.9% [Saline Flush]   10 ml FLUSH ASDIRECTED PRN 


Sodium Chloride 0.9% [Saline Flush]   2.5 ml FLUSH ASDIRECTED PRN 


Saline Lock Insert [OM.PC] Stat 





01/21/20 04:00


CULTURE BLOOD [BC] Stat 





01/21/20 04:19


CULTURE BLOOD [BC] Stat

## 2020-10-28 ENCOUNTER — HOSPITAL ENCOUNTER (EMERGENCY)
Dept: HOSPITAL 56 - MW.ED | Age: 30
Discharge: HOME | End: 2020-10-28
Payer: MEDICAID

## 2020-10-28 DIAGNOSIS — Z88.0: ICD-10-CM
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
## 2020-10-28 DIAGNOSIS — S49.92XA: Primary | ICD-10-CM

## 2020-10-28 NOTE — CR
Indication:



Left shoulder pain. 



Technique:



Left shoulder 3 views.



Comparison:



None.



Findings:



Bones: Alignment is normal. No fractures or bone lesions.  



Joint spaces: Unremarkable.  



Soft tissues: Unremarkable.  



Impression:



No acute osseous abnormalities appreciated.



Dictated by Nathan Trejo MD @ Oct 28 2020  6:11PM



Signed by Dr. Nathan Trejo @ Oct 28 2020  6:12PM

## 2020-10-28 NOTE — EDM.PDOC
ED HPI GENERAL MEDICAL PROBLEM





- General


Chief Complaint: General


Stated Complaint: MED CLEARANCE


Time Seen by Provider: 10/28/20 17:19


Source of Information: Reports: Patient


History Limitations: Reports: No Limitations





- History of Present Illness


INITIAL COMMENTS - FREE TEXT/NARRATIVE: 


HISTORY AND PHYSICAL:





History of present illness:


Patient is a 30-year-old male who presents to the emergency room with law 

enforcement for medical screening exam.  Patient states his left shoulder hurts 

from being tackled by law enforcement.  He does have full range of motion but is

concerned he has a "tear inside".  He also would like it documented that he has 

type 2 diabetes although does not take any medications for this and is 

controlled by diet alone.  He denies hitting his head or having any loss of 

consciousness.  He denies any other extremity involvement.  Patient denies any 

fever, chills, headache, change in vision, syncope or near syncope. Denies any 

chest pain, back pain, shortness of breath or cough. Denies any abdominal pain, 

nausea, vomiting, diarrhea, constipation or dysuria. Has not noted any blood in 

urine or stool. Patient has been eating and drinking appropriately.





Review of systems: 


As per history of present illness and below otherwise all systems reviewed and 

negative.





Past medical history: 


As per history of present illness and as reviewed below otherwise 

noncontributory.





Surgical history: 


As per history of present illness and as reviewed below otherwise 

noncontributory.





Social history: 


See social history for further information





Family history: 


As per history of present illness and as reviewed below otherwise 

noncontributory.





Physical exam:


General: Well developed and well nourished 30-year-old male. Alert and 

orientated x 3. Nontoxic in appearance and in no acute distress. Vital signs are

stable and have been reviewed by me. Nursing notes were reviewed.  Accompanied 

by law enforcement.


HEENT: Atraumatic, normocephalic, pupils equal and reactive bilaterally, 

negative for conjunctival pallor or scleral icterus, mucous membranes moist, 

trachea midline. No drooling or trismus noted. No meningeal signs. No hot potato

voice noted. 


Lungs: Clear to auscultation, breath sounds equal bilaterally, chest nontender. 

Normal work of breathing, no accessory muscles used.


Heart: S1S2, regular rate and rhythm without overt murmur


Abdomen: Soft, nondistended, nontender. Negative for masses or 

hepatosplenomegaly. Negative for costovertebral tenderness.


Pelvis: Stable nontender.


Skin: Intact, warm, dry. No lesions or rashes noted.


Hematologic: No petechiae or purpra. Mucosa appropriate color and normal nail 

bed color and refill.


Extremities: Atraumatic, moves all extremities per self without difficulty or 

deficits, negative for cords or calf pain. Neurovascular unremarkable.


Neuro: Awake, alert, oriented. Cranial nerves II through XII unremarkable. 

Cerebellum unremarkable. Motor and sensory unremarkable throughout. Exam 

nonfocal.


Psychiatric: Mood and affect are appropriate.  Normal thought process. Answering

questions appropriately.





Notes:


Trace shows no acute findings.  Blood glucose is within normal range.  His vital

signs are stable.  He declines wanting any other diagnostics/has no further 

concerns. I have spoken with the patient/caregiver and discussed today's 

findings, in addition to providing specific details for plan of care.  

Reassessment at the time of disposition demonstrates that the patient is in no 

acute distress.  The patient has remained stable throughout the entire ED visit 

and is without objective evidence for acute process requiring urgent 

intervention or hospitalization.  The patient is stable for discharge, 

counseling was provided and we discussed in great detail signs and symptoms that

would prompt them to return to the Emergency Department. Medication, follow up 

and supportive care measures were reviewed and discussed. Voices understanding 

and is agreeable to plan of care. Denies any further questions or concerns at 

this time.





Diagnostics:


Left shoulder x-ray, glucose





Therapeutics:


None





Prescription:


None





Impression: 


Encounter for medical screening exam


Left shoulder injury





Plan:


1. Today your shoulder x-ray is normal. Blood sugar is in normal range. 


2.  You can alternate Tylenol and ibuprofen as needed for pain management.


3. We encourage you to follow up with your primary care provider and/or 

recommended specialist in the next few days for re-evaluation and further 

care/management. If your symptoms should worsen, new symptoms develop or any of 

the signs and symptoms we discussed should arise please return to the emergency 

room or call 911 (if needed).





Definitive disposition and diagnosis as appropriate pending reevaluation and 

review of above.





  ** left shoulder


Pain Score (Numeric/FACES): 7





- Related Data


                                    Allergies











Allergy/AdvReac Type Severity Reaction Status Date / Time


 


Penicillins Allergy  Other Verified 10/28/20 17:32











Home Meds: 


                                    Home Meds





. [No Known Home Meds]  01/19/20 [History]











Past Medical History


HEENT History: Reports: None


Cardiovascular History: Reports: None


Respiratory History: Reports: None


Gastrointestinal History: Reports: None


Genitourinary History: Reports: None


Musculoskeletal History: Reports: None


Neurological History: Reports: None


Psychiatric History: Reports: None


Endocrine/Metabolic History: Reports: None


Hematologic History: Reports: None


Immunologic History: Reports: None


Oncologic (Cancer) History: Reports: None


Dermatologic History: Reports: None





- Infectious Disease History


Infectious Disease History: Reports: None





- Past Surgical History


Head Surgeries/Procedures: Reports: None


HEENT Surgical History: Reports: Tonsillectomy


Cardiovascular Surgical History: Reports: None


Respiratory Surgical History: Reports: None


GI Surgical History: Reports: None


Male  Surgical History: Reports: None


Endocrine Surgical History: Reports: None


Neurological Surgical History: Reports: None


Musculoskeletal Surgical History: Reports: Other (See Below)


Other Musculoskeletal Surgeries/Procedures:: right hand surgery, scope to left 

knee


Oncologic Surgical History: Reports: None


Dermatological Surgical History: Reports: None





Social & Family History





- Family History


Family Medical History: Noncontributory





- Caffeine Use


Caffeine Use: Reports: None





ED ROS GENERAL





- Review of Systems


Review Of Systems: Comprehensive ROS is negative, except as noted in HPI.





ED EXAM, GENERAL





- Physical Exam


Exam: See Below (See dictation)





Course





- Vital Signs


Last Recorded V/S: 


                                Last Vital Signs











Temp  97.7 F   10/28/20 17:29


 


Pulse  119 H  10/28/20 17:29


 


Resp  18   10/28/20 17:29


 


BP  133/57 L  10/28/20 17:29


 


Pulse Ox  94 L  10/28/20 17:29














- Orders/Labs/Meds


Orders: 


                               Active Orders 24 hr











 Category Date Time Status


 


 Blood Glucose Check, Bedside [RC] ONETIME Care  10/28/20 17:36 Ordered











Labs: 


                                Laboratory Tests











  10/28/20 Range/Units





  17:36 


 


POC Glucose  122 H  ()  mg/dL














Departure





- Departure


Time of Disposition: 18:18


Disposition: Home, Self-Care 01


Clinical Impression: 


 Encounter for medical screening examination





Shoulder injury


Qualifiers:


 Encounter type: initial encounter Laterality: left Qualified Code(s): S49.92XA 

- Unspecified injury of left shoulder and upper arm, initial encounter








- Discharge Information


Instructions:  Medical Screening Exam


Referrals: 


PCP,None [Primary Care Provider] - 


Forms:  ED Department Discharge


Additional Instructions: 


The following information is given to patients seen in the emergency department 

who are being discharged to home. This information is to outline your options 

for follow-up care. We provide all patients seen in our emergency department 

with a follow-up referral.





The need for follow-up, as well as the timing and circumstances, are variable 

depending upon the specifics of your emergency department visit.





If you don't have a primary care physician on staff, we will provide you with a 

referral. We always advise you to contact your personal physician following an 

emergency department visit to inform them of the circumstance of the visit and 

for follow-up with them and/or the need for any referrals to a consulting 

specialist.





The emergency department will also refer you to a specialist when appropriate. 

This referral assures that you have the opportunity for follow-up care with a 

specialist. All of these measure are taken in an effort to provide you with 

optimal care, which includes your follow-up.





Under all circumstances we always encourage you to contact your private 

physician who remains a resource for coordinating your care. When calling for 

follow-up care, please make the office aware that this follow-up is from your 

recent emergency room visit. If for any reason you are refused follow-up, please

contact the [facility] Emergency Department

at [phone] and asked to speak to the emergency department charge nurse.





[facility]


Primary Care


[address] [place] [id number]


Phone: [phone]


Fax: [phone]





[place] [address]


Phone: [phone]


Fax: [phone]





Thank you for choosing the [facility] emergency department in 

[place] for your medical needs today.  It was a pleasure caring for you. Today

you were seen in the emergency department for medical screening exam.








1. Today your shoulder x-ray is normal. Blood sugar is in normal range. 


2.  You can alternate Tylenol and ibuprofen as needed for pain management.


3. We encourage you to follow up with your primary care provider and/or 

recommended specialist in the next few days for re-evaluation and further 

care/management. If your symptoms should worsen, new symptoms develop or any of 

the signs and symptoms we discussed should arise please return to the emergency 

room or call 911 (if needed).








Sepsis Event Note (ED)





- Focused Exam


Vital Signs: 


                                   Vital Signs











  Temp Pulse Resp BP Pulse Ox


 


 10/28/20 17:29  97.7 F  119 H  18  133/57 L  94 L














- My Orders


Last 24 Hours: 


My Active Orders





10/28/20 17:36


Blood Glucose Check, Bedside [RC] ONETIME 














- Assessment/Plan


Last 24 Hours: 


My Active Orders





10/28/20 17:36


Blood Glucose Check, Bedside [RC] ONETIME